# Patient Record
Sex: FEMALE
[De-identification: names, ages, dates, MRNs, and addresses within clinical notes are randomized per-mention and may not be internally consistent; named-entity substitution may affect disease eponyms.]

---

## 2018-11-18 ENCOUNTER — HOSPITAL ENCOUNTER (OUTPATIENT)
Dept: HOSPITAL 42 - ED | Age: 68
Setting detail: OBSERVATION
LOS: 2 days | Discharge: HOME | End: 2018-11-20
Attending: INTERNAL MEDICINE | Admitting: INTERNAL MEDICINE
Payer: MEDICARE

## 2018-11-18 VITALS — BODY MASS INDEX: 31.2 KG/M2

## 2018-11-18 DIAGNOSIS — M81.0: ICD-10-CM

## 2018-11-18 DIAGNOSIS — K57.32: Primary | ICD-10-CM

## 2018-11-18 DIAGNOSIS — I10: ICD-10-CM

## 2018-11-18 DIAGNOSIS — E10.9: ICD-10-CM

## 2018-11-18 PROCEDURE — 87040 BLOOD CULTURE FOR BACTERIA: CPT

## 2018-11-18 PROCEDURE — 83615 LACTATE (LD) (LDH) ENZYME: CPT

## 2018-11-18 PROCEDURE — 80053 COMPREHEN METABOLIC PANEL: CPT

## 2018-11-18 PROCEDURE — 74177 CT ABD & PELVIS W/CONTRAST: CPT

## 2018-11-18 PROCEDURE — 87177 OVA AND PARASITES SMEARS: CPT

## 2018-11-18 PROCEDURE — 87209 SMEAR COMPLEX STAIN: CPT

## 2018-11-18 PROCEDURE — 82550 ASSAY OF CK (CPK): CPT

## 2018-11-18 PROCEDURE — 82948 REAGENT STRIP/BLOOD GLUCOSE: CPT

## 2018-11-18 PROCEDURE — 99285 EMERGENCY DEPT VISIT HI MDM: CPT

## 2018-11-18 PROCEDURE — 87324 CLOSTRIDIUM AG IA: CPT

## 2018-11-18 PROCEDURE — 85730 THROMBOPLASTIN TIME PARTIAL: CPT

## 2018-11-18 PROCEDURE — 93005 ELECTROCARDIOGRAM TRACING: CPT

## 2018-11-18 PROCEDURE — 96367 TX/PROPH/DG ADDL SEQ IV INF: CPT

## 2018-11-18 PROCEDURE — 85027 COMPLETE CBC AUTOMATED: CPT

## 2018-11-18 PROCEDURE — 96365 THER/PROPH/DIAG IV INF INIT: CPT

## 2018-11-18 PROCEDURE — 85610 PROTHROMBIN TIME: CPT

## 2018-11-18 PROCEDURE — 36415 COLL VENOUS BLD VENIPUNCTURE: CPT

## 2018-11-18 PROCEDURE — 87045 FECES CULTURE AEROBIC BACT: CPT

## 2018-11-18 PROCEDURE — 71045 X-RAY EXAM CHEST 1 VIEW: CPT

## 2018-11-18 PROCEDURE — 83690 ASSAY OF LIPASE: CPT

## 2018-11-18 PROCEDURE — 96366 THER/PROPH/DIAG IV INF ADDON: CPT

## 2018-11-18 PROCEDURE — 85025 COMPLETE CBC W/AUTO DIFF WBC: CPT

## 2018-11-18 PROCEDURE — 96375 TX/PRO/DX INJ NEW DRUG ADDON: CPT

## 2018-11-18 PROCEDURE — 84484 ASSAY OF TROPONIN QUANT: CPT

## 2018-11-19 LAB
ALBUMIN SERPL-MCNC: 4.3 G/DL (ref 3–4.8)
ALBUMIN/GLOB SERPL: 1.3 {RATIO} (ref 1.1–1.8)
ALT SERPL-CCNC: 32 U/L (ref 7–56)
APTT BLD: 30.3 SECONDS (ref 25.1–36.5)
AST SERPL-CCNC: 25 U/L (ref 14–36)
BUN SERPL-MCNC: 23 MG/DL (ref 7–21)
CALCIUM SERPL-MCNC: 9.5 MG/DL (ref 8.4–10.5)
ERYTHROCYTE [DISTWIDTH] IN BLOOD BY AUTOMATED COUNT: 13.7 % (ref 11.5–14.5)
GFR NON-AFRICAN AMERICAN: > 60
HGB BLD-MCNC: 13.1 G/DL (ref 12–16)
INR PPP: 1.06
LIPASE SERPL-CCNC: 101 U/L (ref 23–300)
MCH RBC QN AUTO: 29.8 PG (ref 25–35)
MCHC RBC AUTO-ENTMCNC: 33.4 G/DL (ref 31–37)
MCV RBC AUTO: 89.1 FL (ref 80–105)
PLATELET # BLD: 174 10^3/UL (ref 120–450)
PMV BLD AUTO: 10.8 FL (ref 7–11)
PROTHROMBIN TIME: 12.1 SECONDS (ref 9.4–12.5)
RBC # BLD AUTO: 4.4 10^6/UL (ref 3.5–6.1)
TROPONIN I SERPL-MCNC: < 0.01 NG/ML
WBC # BLD AUTO: 11.5 10^3/UL (ref 4.5–11)

## 2018-11-19 RX ADMIN — PROMETHAZINE HYDROCHLORIDE SCH MG: 6.25 SYRUP ORAL at 21:40

## 2018-11-19 RX ADMIN — METRONIDAZOLE SCH MLS/HR: 500 INJECTION, SOLUTION INTRAVENOUS at 13:56

## 2018-11-19 RX ADMIN — METRONIDAZOLE SCH MLS/HR: 500 INJECTION, SOLUTION INTRAVENOUS at 21:40

## 2018-11-19 RX ADMIN — PROMETHAZINE HYDROCHLORIDE SCH MG: 6.25 SYRUP ORAL at 13:58

## 2018-11-19 NOTE — CP.PCM.CON
History of Present Illness





- History of Present Illness


History of Present Illness: 


Gastroenterology Fellow/PGY6 Consult Note





68 year old female with PMH of Diabetes, HTN, and osteoporosis presenting with 

abdominal pain. Patient notes onset of progressive lower abdominal pain leading 

to ER presentation. Also admits to increased soft stool frequency. Associated 

subjective fever. Denies nausea, vomiting, hematemesis, constipation, melena, 

hematochezia, unintentional weight loss, sick contacts, or recent travel/antib

iotics. Endorses prior EGD and colonoscopy in last five months to be normal.





Family History- denies stomach cancer, colon cancer


Social History- denies tobacco, alcohol, illicit drug use


Surgical History- none








Review of Systems





- Review of Systems


Review of Systems: 


12-point review of systems negative except for as above








Past Patient History





- Infectious Disease


Hx of Infectious Diseases: None





- Past Social History


Smoking Status: Never Smoked





- CARDIAC


Hx Cardiac Disorders: Yes


Hx Hypertension: Yes





- PULMONARY


Hx Respiratory Disorders: No





- NEUROLOGICAL


Hx Neurological Disorder: No





- HEENT


Hx HEENT Problems: No





- RENAL


Hx Chronic Kidney Disease: No





- ENDOCRINE/METABOLIC


Hx Diabetes Mellitus Type 2: Yes





- HEMATOLOGICAL/ONCOLOGICAL


Hx Blood Disorders: No





- INTEGUMENTARY


Hx Dermatological Problems: No





- MUSCULOSKELETAL/RHEUMATOLOGICAL


Hx Musculoskeletal Disorders: Yes


Hx Arthritis: Yes


Hx Osteoarthritis: Yes





- GASTROINTESTINAL


Hx Gastrointestinal Disorders: No





- GENITOURINARY/GYNECOLOGICAL


Hx Genitourinary Disorders: No





- PSYCHIATRIC


Hx Psychophysiologic Disorder: No





- SURGICAL HISTORY


Hx Surgeries: No





Meds


Allergies/Adverse Reactions: 


                                    Allergies











Allergy/AdvReac Type Severity Reaction Status Date / Time


 


No Known Allergies Allergy   Verified 11/19/18 00:38














- Medications


Medications: 


                               Current Medications





Sodium Chloride (Sodium Chloride 0.9%)  1,000 mls @ 100 mls/hr IV .Q10H [facility]


   Last Admin: 11/19/18 05:10 Dose:  100 mls/hr


Metronidazole (Flagyl)  500 mg in 100 mls @ 100 mls/hr IVPB Q8 [facility]; Protocol


   Last Admin: 11/19/18 13:56 Dose:  100 mls/hr


Ceftriaxone Sodium (Rocephin 1 Gram Ivpb)  1 gm in 100 mls @ 100 mls/hr IVPB 

DAILY [facility]; Protocol


Promethazine HCl (Phenergan Syrup)  6.25 mg PO BID [facility]


   Last Admin: 11/19/18 13:58 Dose:  6.25 mg











Physical Exam





- Constitutional


Appears: Non-toxic, No Acute Distress





- Head Exam


Head Exam: ATRAUMATIC, NORMOCEPHALIC





- Eye Exam


Eye Exam: EOMI, PERRL.  absent: Scleral icterus


Pupil Exam: PERRL.  absent: Miosis, Mydriatic





- ENT Exam


ENT Exam: Mucous Membranes Moist, Normal Oropharynx





- Neck Exam


Neck exam: Positive for: Full Rom, Normal Inspection





- Respiratory Exam


Respiratory Exam: Clear to Auscultation Bilateral.  absent: Rales, Rhonchi, 

Wheezes





- Cardiovascular Exam


Cardiovascular Exam: RRR, +S1, +S2.  absent: Gallop, Rubs





- GI/Abdominal Exam


GI & Abdominal Exam: Normal Bowel Sounds, Soft, Tenderness.  absent: Distended, 

Firm, Guarding, Organomegaly, Rebound, Rigid


Additional comments: 


B/L LQ tenderness to palpation








- Extremities Exam


Extremities exam: Positive for: normal inspection.  Negative for: pedal edema





- Neurological Exam


Neurological exam: Alert





- Psychiatric Exam


Psychiatric exam: Normal Affect, Normal Mood





- Skin


Skin Exam: Dry, Intact, Normal Color, Warm





Results





- Vital Signs


Recent Vital Signs: 


                                Last Vital Signs











Temp  98.3 F   11/19/18 08:37


 


Pulse  74   11/19/18 08:37


 


Resp  20   11/19/18 08:37


 


BP  121/71   11/19/18 08:37


 


Pulse Ox  94 L  11/19/18 08:37














- Labs


Result Diagrams: 


                                 11/19/18 01:22





                                 11/19/18 01:22


Labs: 


                         Laboratory Results - last 24 hr











  11/19/18 11/19/18 11/19/18





  01:22 01:22 01:22


 


WBC    11.5 H


 


RBC    4.40


 


Hgb    13.1


 


Hct    39.2


 


MCV    89.1


 


MCH    29.8


 


MCHC    33.4


 


RDW    13.7


 


Plt Count    174


 


MPV    10.8


 


PT  12.1  


 


INR  1.06  


 


APTT  30.3  


 


Sodium   138 


 


Potassium   4.2 


 


Chloride   102 


 


Carbon Dioxide   26 


 


Anion Gap   14 


 


BUN   23 H 


 


Creatinine   0.6 L 


 


Est GFR ( Amer)   > 60 


 


Est GFR (Non-Af Amer)   > 60 


 


POC Glucose (mg/dL)   


 


Random Glucose   219 H 


 


Calcium   9.5 


 


Total Bilirubin   0.9 


 


AST   25 


 


ALT   32 


 


Alkaline Phosphatase   106 


 


Lactate Dehydrogenase   450 


 


Total Creatine Kinase   91 


 


Troponin I   < 0.01 


 


Total Protein   7.6 


 


Albumin   4.3 


 


Globulin   3.3 


 


Albumin/Globulin Ratio   1.3 


 


Lipase   101 














  11/19/18





  05:47


 


WBC 


 


RBC 


 


Hgb 


 


Hct 


 


MCV 


 


MCH 


 


MCHC 


 


RDW 


 


Plt Count 


 


MPV 


 


PT 


 


INR 


 


APTT 


 


Sodium 


 


Potassium 


 


Chloride 


 


Carbon Dioxide 


 


Anion Gap 


 


BUN 


 


Creatinine 


 


Est GFR ( Amer) 


 


Est GFR (Non-Af Amer) 


 


POC Glucose (mg/dL)  120 H


 


Random Glucose 


 


Calcium 


 


Total Bilirubin 


 


AST 


 


ALT 


 


Alkaline Phosphatase 


 


Lactate Dehydrogenase 


 


Total Creatine Kinase 


 


Troponin I 


 


Total Protein 


 


Albumin 


 


Globulin 


 


Albumin/Globulin Ratio 


 


Lipase 














Assessment & Plan





- Assessment and Plan (Free Text)


Assessment: 


68 year old female with PMH of Diabetes, HTN, and osteoporosis presenting with 

abdominal pain. Active treatment of uncomoplicated sigmoid diverticulitis noted 

on CT A/P IV contrast. Endorses prior EGD and colonoscopy in last five months to

be normal.


Plan: 





-ordered Cdiff, stool culture, O&P


-on ceftriaxone and flagyl


-liquid diet, advance as tolerated


-will require 10-14 day antibiotic course


-endorses recent colonoscopy in last 6 months with poor prep


-will benefit from outpatient follow up with gastroenterologist for re-

evaluation and repeat colonoscopy in 6-8 weeks


-will follow clinical course

## 2018-11-19 NOTE — CT
Date of service: 



11/19/2018



PROCEDURE:  CT Abdomen and Pelvis with contrast



HISTORY:

abdominal pain



COMPARISON:

None.



TECHNIQUE:

Contrast dose: 100 cc of Omni 350



Radiation dose:



Total exam DLP = 878.29 mGy-cm.



This CT exam was performed using one or more of the following dose 

reduction techniques: Automated exposure control, adjustment of the 

mA and/or kV according to patient size, and/or use of iterative 

reconstruction technique.



FINDINGS:



LOWER THORAX:

Unremarkable. 



LIVER:

Unremarkable. No gross lesion or ductal dilatation. 



GALLBLADDER AND BILE DUCTS:

Unremarkable. 



PANCREAS:

Unremarkable. No gross lesion or ductal dilatation.



SPLEEN:

Unremarkable. 



ADRENALS:

Unremarkable. No mass. 



KIDNEYS AND URETERS:

Unremarkable. No hydronephrosis. No solid mass. 



VASCULATURE:

Unremarkable. No aortic aneurysm. No aortic atherosclerotic 

calcification or mural plaque present.



BOWEL:

There is mural thickening and mesenteric inflammation in the sigmoid 

colon consistent with acute diverticulitis.  There is no evidence of 

abscess or free air.  



APPENDIX:

Normal appendix. 



PERITONEUM:

Unremarkable. No free fluid. No free air. 



LYMPH NODES:

Unremarkable. No enlarged lymph nodes. 



BLADDER:

Unremarkable. 



REPRODUCTIVE:

Unremarkable. 



BONES:

No acute fracture. 



OTHER FINDINGS:

The report concurs with the preliminary [facility] report



IMPRESSION:

There is mural thickening and mesenteric inflammation in the sigmoid 

colon consistent with acute diverticulitis.  There is no evidence of 

abscess or free air.

## 2018-11-19 NOTE — ED PDOC
Arrival/HPI





- General


Chief Complaint: Headache


Time Seen by Provider: 11/19/18 00:16


Historian: Patient





- History of Present Illness


Narrative History of Present Illness (Text): 





11/19/18 01:07


A 68 year old female, whose past medical history includes diabetes, hypertension

and osteoporosis, presents to the emergency department complaining of 

generalized malaise,lower abdominal discomfort.Slight headache.No known fever. 

Patient states she is also having trouble sleeping and states her  is 

currently in the hospital. Patient denies any visual disturbances, chills,  

shortness of breath, chest pain, diarrhea, nausea, vomiting, urinary symptoms, 

back pain, neck pain, headache, dizziness, or any other complaints. 


PMD: Dr. English





Time/Duration: Other (earlier today)


Symptom Onset: Gradual


Activities at Onset: Light


Context: Home





Past Medical History





- Provider Review


Nursing Documentation Reviewed: Yes





- Infectious Disease


Hx of Infectious Diseases: None





- Cardiac


Hx Cardiac Disorders: No





- Pulmonary


Hx Respiratory Disorders: No





- Neurological


Hx Neurological Disorder: No





- HEENT


Hx HEENT Disorder: No





- Renal


Hx Renal Disorder: No





- Endocrine/Metabolic


Hx Endocrine Disorders: Yes


Hx Diabetes Mellitus Type 1: Yes





- Hematological/Oncological


Hx Blood Disorders: No





- Integumentary


Hx Dermatological Disorder: No





- Musculoskeletal/Rheumatological


Hx Musculoskeletal Disorders: Yes


Hx Arthritis: Yes


Hx Osteomyelitis: Yes





- Gastrointestinal


Hx Gastrointestinal Disorders: No





- Genitourinary/Gynecological


Hx Genitourinary Disorders: No





- Psychiatric


Hx Psychophysiologic Disorder: No


Hx Substance Use: No





Family/Social History





- Physician Review


Nursing Documentation Reviewed: Yes


Family/Social History: No Known Family HX


Smoking Status: Never Smoked


Hx Alcohol Use: No


Hx Substance Use: No





Allergies/Home Meds


Allergies/Adverse Reactions: 


Allergies





No Known Allergies Allergy (Verified 11/19/18 00:38)


   








Home Medications: 


                                    Home Meds











 Medication  Instructions  Recorded  Confirmed


 


No Known Home Med  11/19/18 11/19/18














Review of Systems





- Physician Review


All systems were reviewed & negative as marked: Yes





- Review of Systems


Constitutional: Other (trouble sleeping).  absent: Fevers, Night Sweats


Eyes: absent: Vision Changes (no visual disturbances)


Respiratory: absent: SOB


Cardiovascular: absent: Chest Pain


Gastrointestinal: Abdominal Pain (abdominal discomfort).  absent: Diarrhea, 

Nausea, Vomiting


Musculoskeletal: absent: Back Pain, Neck Pain


Neurological: Headache.  absent: Dizziness





Physical Exam


Vital Signs Reviewed: Yes





Vital Signs











  Temp Pulse Resp BP Pulse Ox


 


 11/19/18 00:28  98.1 F  76  18  144/86  98











Temperature: Afebrile


Blood Pressure: Normal


Pulse: Regular


Respiratory Rate: Normal


Appearance: Positive for: Well-Appearing, Non-Toxic, Comfortable


Pain Distress: None


Mental Status: Positive for: Alert and Oriented X 3





- Systems Exam


Head: Present: Atraumatic, Normocephalic


Pupils: Present: PERRL


Extroacular Muscles: Present: EOMI


Conjunctiva: Present: Normal


Mouth: Present: Moist Mucous Membranes


Neck: Present: Normal Range of Motion


Respiratory/Chest: Present: Clear to Auscultation, Good Air Exchange.  No: 

Respiratory Distress, Accessory Muscle Use


Cardiovascular: Present: Regular Rate and Rhythm, Normal S1, S2.  No: Murmurs


Abdomen: Present: Tenderness (left lower abdomen), Normal Bowel Sounds.  No: 

Distention, Peritoneal Signs, McBurney's Point Tender, Hernias


Back: Present: Normal Inspection


Upper Extremity: Present: Normal Inspection.  No: Cyanosis, Edema


Lower Extremity: Present: Normal Inspection.  No: Edema


Neurological: Present: GCS=15, CN II-XII Intact, Speech Normal, Motor Func 

Grossly Intact, Normal Sensory Function


Skin: Present: Warm, Dry, Normal Color.  No: Rashes


Psychiatric: Present: Alert, Oriented x 3, Normal Insight, Normal Concentration





Medical Decision Making


ED Course and Treatment: 





11/19/18 01:10


Impression: 68 year old female presents to the emergency room for generalized 

malaise,abdominal discomfort. 





Plan:


-- EKG


-- Cardiac ISO


-- CMP


-- Lipase


-- CBC


-- COAGs


-- Chest X-ray 


-- Reassess and disposition





Prior Visits:


Notes and results from previous visits were reviewed.  





Progress Notes:





11/19/18 01:29


EKG: Ordered, reviewed, and independently interpreted the EKG.


Rate : 72 BPM


Rhythm : NSR


Interpretation : Non specific T-wave changes.





11/19/18 03:28


Procedure: Chest X-ray 


Impression: No acute process.


Reviewed by me.





11/19/18 03:55


Procedure: CT abdomen and pelvis with contrast


Impression: Acute proximal sigmoid diverticulitis without perforation or abscess

formation.


Dictator: Dr. Ayde Dailey M.D.





11/19/18 05:24


Case discussed with Dr. Rivera who accepts the patient into his service for 

further observation and requests Dr. Núñez on consult.





- RAD Interpretation


Radiology Orders: 











11/19/18 00:51


CHEST PORTABLE [RAD] Stat 














- Scribe Statement


The provider has reviewed the documentation as recorded by the Scribe





Elizabet Quinn





All medical record entries made by the Scribe were at my direction and 

personally dictated by me. I have reviewed the chart and agree that the record 

accurately reflects my personal performance of the history, physical exam, 

medical decision making, and the department course for this patient. I have also

 personally directed, reviewed, and agree with the discharge instructions and 

disposition.








Disposition/Present on Arrival





- Present on Arrival


Any Indicators Present on Arrival: No


History of DVT/PE: No


History of Uncontrolled Diabetes: No


Urinary Catheter: No


History of Decub. Ulcer: No


History Surgical Site Infection Following: None





- Disposition


Have Diagnosis and Disposition been Completed?: Yes


Diagnosis: 


 Diverticulitis





Disposition: HOSPITALIZED


Disposition Time: 05:01


Patient Plan: Admission


Patient Problems: 


                             Current Active Problems











Problem Status Onset


 


Diverticulitis Acute 











Condition: STABLE

## 2018-11-19 NOTE — CARD
--------------- APPROVED REPORT --------------





Date of service: 11/19/2018



EKG Measurement

Heart [id number]

[id number]

[id number]

[id number]

[id number]



<Conclusion>

Normal sinus rhythm

Nonspecific T wave abnormality

LAD

## 2018-11-19 NOTE — CP.PCM.HP
<Jacob Mota - Last Filed: 11/19/18 15:13>





History of Present Illness





- History of Present Illness


History of Present Illness: 





H&P for Dr. Rivera Service





CC: Lower abdominal pain





This is a 67 yo  F with PMH of DM-1, HTN, osteoporosis, and arthritis 

who presented with complaint of worsening malaise and lower abdominal pain x2-3 

days.  As per patient, patient worsening without acute exacerbating factor over 

the last 2-3 days, and she has noticed increased frequency and softer stools 

which she likens in consistency to phlegm.  Denies melena, bright red blood per 

rectum, emesis, fevers, chills, or shortness of breath.  Does admit to nausea 

and decreased appetite, but has been able to tolerate PO intake.  Additionally 

complains of headache, and admits to increased stress, as her  is 

currently hospitalized (here at [facility] [place]  At time of exam, patient is resting 

comfortably in bed, talking on a cell phone, not in any acute distress.





Of note, in the ED, CT abd/pelvis was obtained that was notable for mural 

thickening and mesenteric inflammation suggestive of acute sigmoid 

diverticulitis, without evidence of abscess or perforation.





12 system ROS reviewed and negative except as above.





PMH: as above


PSH: denies


Fam Hx: HTN


Soc Hx: Denies tobacco, alcohol, illicits, IVDA





PMD: Dr. English








Present on Admission





- Present on Admission


Any Indicators Present on Admission: No


History of DVT/PE: No


History of Uncontrolled Diabetes: No


Urinary Catheter: No





Review of Systems





- Review of Systems


All systems: reviewed and no additional remarkable complaints except (as per 

HPI)





Past Patient History





- Infectious Disease


Hx of Infectious Diseases: None





- Past Social History


Smoking Status: Never Smoked





- CARDIAC


Hx Cardiac Disorders: Yes


Hx Hypertension: Yes





- PULMONARY


Hx Respiratory Disorders: No





- NEUROLOGICAL


Hx Neurological Disorder: No





- HEENT


Hx HEENT Problems: No





- RENAL


Hx Chronic Kidney Disease: No





- ENDOCRINE/METABOLIC


Hx Diabetes Mellitus Type 2: Yes





- HEMATOLOGICAL/ONCOLOGICAL


Hx Blood Disorders: No





- INTEGUMENTARY


Hx Dermatological Problems: No





- MUSCULOSKELETAL/RHEUMATOLOGICAL


Hx Musculoskeletal Disorders: Yes


Hx Arthritis: Yes


Hx Osteoarthritis: Yes





- GASTROINTESTINAL


Hx Gastrointestinal Disorders: No





- GENITOURINARY/GYNECOLOGICAL


Hx Genitourinary Disorders: No





- PSYCHIATRIC


Hx Psychophysiologic Disorder: No





- SURGICAL HISTORY


Hx Surgeries: No





Meds


Allergies/Adverse Reactions: 


                                    Allergies











Allergy/AdvReac Type Severity Reaction Status Date / Time


 


No Known Allergies Allergy   Verified 11/19/18 00:38














Physical Exam





- Constitutional


Appears: Non-toxic, No Acute Distress





- Head Exam


Head Exam: ATRAUMATIC, NORMAL INSPECTION, NORMOCEPHALIC





- Eye Exam


Eye Exam: EOMI, Normal appearance.  absent: Conjunctival injection, Scleral 

icterus


Pupil Exam: absent: Irregular, Unequal





- ENT Exam


ENT Exam: Mucous Membranes Dry





- Neck Exam


Neck exam: Positive for: Full Rom, Normal Inspection.  Negative for: 

Lymphadenopathy





- Respiratory Exam


Respiratory Exam: Clear to Auscultation Bilateral, NORMAL BREATHING PATTERN.  

absent: Accessory Muscle Use, Chest Wall Tenderness, Decreased Breath Sounds, 

Rales, Rhonchi, Wheezes





- Cardiovascular Exam


Cardiovascular Exam: REGULAR RHYTHM, RRR, +S1, +S2.  absent: Bradycardia, 

Tachycardia, Irregular Rhythm, JVD, +S4





- GI/Abdominal Exam


GI & Abdominal Exam: Normal Bowel Sounds, Soft, Tenderness (diffusely tender to 

palpation in all quadrants, tenderness in lower quadrants > upper quadrants, no 

CVA tenderness).  absent: Diminished Bowel Sounds, Distended, Firm, Hyperactive 

Bowel Sounds, Hypoactive Bowel Sounds, Rigid


Additional comments: 





Reports improvement in her pain by laying in left lateral recumbent position








- Extremities Exam


Extremities exam: Positive for: normal inspection, pedal pulses present.  

Negative for: calf tenderness, pedal edema





- Back Exam


Back exam: absent: CVA tenderness (L), CVA tenderness (R)





- Neurological Exam


Additional comments: 





awake and alert, moving all extremities spontaneously, following all commands 

appropriately








- Psychiatric Exam


Psychiatric exam: Normal Affect, Normal Mood





- Skin


Skin Exam: Dry, Intact, Normal Color, Warm





Results





- Vital Signs


Recent Vital Signs: 





                                Last Vital Signs











Temp  98.3 F   11/19/18 08:37


 


Pulse  74   11/19/18 08:37


 


Resp  20   11/19/18 08:37


 


BP  121/71   11/19/18 08:37


 


Pulse Ox  94 L  11/19/18 08:37














- Labs


Result Diagrams: 


                                 11/19/18 01:22





                                 11/19/18 01:22


Labs: 





                         Laboratory Results - last 24 hr











  11/19/18 11/19/18 11/19/18





  01:22 01:22 01:22


 


WBC    11.5 H


 


RBC    4.40


 


Hgb    13.1


 


Hct    39.2


 


MCV    89.1


 


MCH    29.8


 


MCHC    33.4


 


RDW    13.7


 


Plt Count    174


 


MPV    10.8


 


PT  12.1  


 


INR  1.06  


 


APTT  30.3  


 


Sodium   138 


 


Potassium   4.2 


 


Chloride   102 


 


Carbon Dioxide   26 


 


Anion Gap   14 


 


BUN   23 H 


 


Creatinine   0.6 L 


 


Est GFR ( Amer)   > 60 


 


Est GFR (Non-Af Amer)   > 60 


 


POC Glucose (mg/dL)   


 


Random Glucose   219 H 


 


Calcium   9.5 


 


Total Bilirubin   0.9 


 


AST   25 


 


ALT   32 


 


Alkaline Phosphatase   106 


 


Lactate Dehydrogenase   450 


 


Total Creatine Kinase   91 


 


Troponin I   < 0.01 


 


Total Protein   7.6 


 


Albumin   4.3 


 


Globulin   3.3 


 


Albumin/Globulin Ratio   1.3 


 


Lipase   101 














  11/19/18





  05:47


 


WBC 


 


RBC 


 


Hgb 


 


Hct 


 


MCV 


 


MCH 


 


MCHC 


 


RDW 


 


Plt Count 


 


MPV 


 


PT 


 


INR 


 


APTT 


 


Sodium 


 


Potassium 


 


Chloride 


 


Carbon Dioxide 


 


Anion Gap 


 


BUN 


 


Creatinine 


 


Est GFR ( Amer) 


 


Est GFR (Non-Af Amer) 


 


POC Glucose (mg/dL)  120 H


 


Random Glucose 


 


Calcium 


 


Total Bilirubin 


 


AST 


 


ALT 


 


Alkaline Phosphatase 


 


Lactate Dehydrogenase 


 


Total Creatine Kinase 


 


Troponin I 


 


Total Protein 


 


Albumin 


 


Globulin 


 


Albumin/Globulin Ratio 


 


Lipase 














Assessment & Plan





- Assessment and Plan (Free Text)


Assessment: 





This is a 67 yo  F with PMH of DM-1, HTN, osteoporosis, and arthritis 

who presented with complaint of worsening malaise and lower abdominal pain x2-3 

days.  Admitted with concern for acute sigmoid diverticulitis.


Plan: 





1) Generalized abd pain


-collitis vs enteritis vs diverticulitis


   possibly an anxiety component given stress with hospitalized 


-CT Abd/pelvis notable for acute sigmoid diverticulitis without signs of abscess

or perforation


-Started on IV Flagyl and Rocephin by ED, will continue IV on floors and convert

to PO flagyl and cipro prior to discharge


-GI consulted, appreciate their recs


-mild leukocytosis without febrile temps; blood cultures obtained, pending 

results, f/u


-Tylenol/Percocet PRN for mild/moderate pain, Promethazine for N/V ppx


-Will trial on clear liquid diet, if tolerates advance as tolerated


-Hold home crestor for now


-NS 100cc/hr to make up for presumed fluid deficit given poor PO intake and dry 

mucous membranes on exam





2) HTN


-currently normotensive, will hold home antiHTN meds at this time





3) Reported hx DM-1


-no home insulins listed in home medications


-will start low-dose sliding scale for coverage and fingersticks ACHS


   will f/u with patient's outpatient pharmacy to confirm home meds





Dispo: Med-surg, pending GI recs, pending advancement of diet as tolerated, 

possible d/c tomorrow


FEN: Liquid diet


Access: Peripheral IV


Consults: GI


Ppx: Protonix for GI, SCDs for DVT





Patient reviewed and discussed with attending, Dr. Rivera





<Mikey Rivera S - Last Filed: 11/19/18 21:23>





Results





- Vital Signs


Recent Vital Signs: 





                                Last Vital Signs











Temp  98.3 F   11/19/18 16:39


 


Pulse  65   11/19/18 16:39


 


Resp  20   11/19/18 16:39


 


BP  118/62   11/19/18 16:39


 


Pulse Ox  96   11/19/18 16:39














- Labs


Result Diagrams: 


                                 11/19/18 01:22





                                 11/19/18 01:22


Labs: 





                         Laboratory Results - last 24 hr











  11/19/18 11/19/18 11/19/18





  01:22 01:22 01:22


 


WBC    11.5 H


 


RBC    4.40


 


Hgb    13.1


 


Hct    39.2


 


MCV    89.1


 


MCH    29.8


 


MCHC    33.4


 


RDW    13.7


 


Plt Count    174


 


MPV    10.8


 


PT  12.1  


 


INR  1.06  


 


APTT  30.3  


 


Sodium   138 


 


Potassium   4.2 


 


Chloride   102 


 


Carbon Dioxide   26 


 


Anion Gap   14 


 


BUN   23 H 


 


Creatinine   0.6 L 


 


Est GFR ( Amer)   > 60 


 


Est GFR (Non-Af Amer)   > 60 


 


POC Glucose (mg/dL)   


 


Random Glucose   219 H 


 


Calcium   9.5 


 


Total Bilirubin   0.9 


 


AST   25 


 


ALT   32 


 


Alkaline Phosphatase   106 


 


Lactate Dehydrogenase   450 


 


Total Creatine Kinase   91 


 


Troponin I   < 0.01 


 


Total Protein   7.6 


 


Albumin   4.3 


 


Globulin   3.3 


 


Albumin/Globulin Ratio   1.3 


 


Lipase   101 














  11/19/18





  05:47


 


WBC 


 


RBC 


 


Hgb 


 


Hct 


 


MCV 


 


MCH 


 


MCHC 


 


RDW 


 


Plt Count 


 


MPV 


 


PT 


 


INR 


 


APTT 


 


Sodium 


 


Potassium 


 


Chloride 


 


Carbon Dioxide 


 


Anion Gap 


 


BUN 


 


Creatinine 


 


Est GFR ( Amer) 


 


Est GFR (Non-Af Amer) 


 


POC Glucose (mg/dL)  120 H


 


Random Glucose 


 


Calcium 


 


Total Bilirubin 


 


AST 


 


ALT 


 


Alkaline Phosphatase 


 


Lactate Dehydrogenase 


 


Total Creatine Kinase 


 


Troponin I 


 


Total Protein 


 


Albumin 


 


Globulin 


 


Albumin/Globulin Ratio 


 


Lipase 














Assessment & Plan





- Assessment and Plan (Free Text)


Plan: 





Pt seen and examined. I have reviewed the note of the medical resident and agree

with it. I have discussed the assessment and plan with the resident.  I have 

reviewed the patient's labs and medications. She has acute diverticulits. She is

on Flagyl and Rocephin. GI will evaluate. Pain controlled on Tylenol and 

Percocet. Continue with IVF.

## 2018-11-19 NOTE — RAD
Date of service: 



11/19/2018



PROCEDURE:  CHEST RADIOGRAPH, 1 VIEW



HISTORY:

abdominal pain



COMPARISON:

None available.



FINDINGS:



LUNGS:

The lungs are well inflated and clear.  There is subsegmental 

atelectasis in the left lower lobe. 



PLEURA:

No pneumothorax or pleural effusion.



CARDIOVASCULAR:

The heart is normal in size.  No aortic atherosclerotic 

calcifications present. 



OSSEOUS STRUCTURES:

Within normal limits for the patient's age.



VISUALIZED UPPER ABDOMEN:

Normal.



OTHER FINDINGS:

None. 



IMPRESSION:

No active pulmonary disease.

## 2018-11-20 VITALS
RESPIRATION RATE: 19 BRPM | HEART RATE: 60 BPM | DIASTOLIC BLOOD PRESSURE: 75 MMHG | OXYGEN SATURATION: 95 % | SYSTOLIC BLOOD PRESSURE: 121 MMHG | TEMPERATURE: 98.1 F

## 2018-11-20 LAB
ALBUMIN SERPL-MCNC: 3.2 G/DL (ref 3–4.8)
ALBUMIN/GLOB SERPL: 1.1 {RATIO} (ref 1.1–1.8)
ALT SERPL-CCNC: 28 U/L (ref 7–56)
AST SERPL-CCNC: 17 U/L (ref 14–36)
BASOPHILS # BLD AUTO: 0.01 K/MM3 (ref 0–2)
BASOPHILS NFR BLD: 0.1 % (ref 0–3)
BUN SERPL-MCNC: 11 MG/DL (ref 7–21)
CALCIUM SERPL-MCNC: 8.3 MG/DL (ref 8.4–10.5)
EOSINOPHIL # BLD: 0.1 10*3/UL (ref 0–0.7)
EOSINOPHIL NFR BLD: 0.7 % (ref 1.5–5)
ERYTHROCYTE [DISTWIDTH] IN BLOOD BY AUTOMATED COUNT: 14.1 % (ref 11.5–14.5)
GFR NON-AFRICAN AMERICAN: > 60
GRANULOCYTES # BLD: 6.9 10*3/UL (ref 1.4–6.5)
GRANULOCYTES NFR BLD: 65.7 % (ref 50–68)
HGB BLD-MCNC: 11.9 G/DL (ref 12–16)
LYMPHOCYTES # BLD: 2.9 10*3/UL (ref 1.2–3.4)
LYMPHOCYTES NFR BLD AUTO: 27.2 % (ref 22–35)
MCH RBC QN AUTO: 30.4 PG (ref 25–35)
MCHC RBC AUTO-ENTMCNC: 33.9 G/DL (ref 31–37)
MCV RBC AUTO: 89.8 FL (ref 80–105)
MONOCYTES # BLD AUTO: 0.7 10*3/UL (ref 0.1–0.6)
MONOCYTES NFR BLD: 6.3 % (ref 1–6)
PLATELET # BLD: 146 10^3/UL (ref 120–450)
PMV BLD AUTO: 10.6 FL (ref 7–11)
RBC # BLD AUTO: 3.91 10^6/UL (ref 3.5–6.1)
WBC # BLD AUTO: 10.5 10^3/UL (ref 4.5–11)

## 2018-11-20 RX ADMIN — PROMETHAZINE HYDROCHLORIDE SCH MG: 6.25 SYRUP ORAL at 11:13

## 2018-11-20 RX ADMIN — METRONIDAZOLE SCH MLS/HR: 500 INJECTION, SOLUTION INTRAVENOUS at 05:20

## 2018-11-20 NOTE — CP.PCM.DIS
<Jacob Mota - Last Filed: 11/20/18 14:54>





Provider





- Provider


Date of Admission: 


11/19/18 05:01





Attending physician: 


Mikey Rivera MD





Primary care physician: 


Kari English DO





Consults: 


GI: [place]


Time Spent in preparation of Discharge (in minutes): 35





Diagnosis





- Discharge Diagnosis


(1) Diverticulitis


Status: Acute   Priority: High   





(2) Diabetes type I


Status: Chronic   Priority: Medium   





(3) HTN (hypertension)


Status: Chronic   Priority: Medium   





(4) Osteoporosis


Status: Chronic   Priority: Medium   





Hospital Course





- Lab Results


Lab Results: 


                                  Micro Results





11/20/18 00:50   Stool   C. difficile Antigen & Toxins A,B - Final


11/19/18 05:20   Blood   Blood Culture - Preliminary


                            NO GROWTH AFTER 24 HOURS


11/19/18 04:53   Blood   Blood Culture - Preliminary


                            NO GROWTH AFTER 24 HOURS





                             Most Recent Lab Values











WBC  10.5 10^3/uL (4.5-11.0)   11/20/18  07:15    


 


RBC  3.91 10^6/uL (3.5-6.1)   11/20/18  07:15    


 


Hgb  11.9 g/dL (12.0-16.0)  L  11/20/18  07:15    


 


Hct  35.1 % (36.0-48.0)  L  11/20/18  07:15    


 


MCV  89.8 fl (80.0-105.0)   11/20/18  07:15    


 


MCH  30.4 pg (25.0-35.0)   11/20/18  07:15    


 


MCHC  33.9 g/dl (31.0-37.0)   11/20/18  07:15    


 


RDW  14.1 % (11.5-14.5)   11/20/18  07:15    


 


Plt Count  146 10^3/uL (120.0-450.0)   11/20/18  07:15    


 


MPV  10.6 fl (7.0-11.0)   11/20/18  07:15    


 


Gran %  65.7 % (50.0-68.0)   11/20/18  07:15    


 


Lymph % (Auto)  27.2 % (22.0-35.0)   11/20/18  07:15    


 


Mono % (Auto)  6.3 % (1.0-6.0)  H  11/20/18  07:15    


 


Eos % (Auto)  0.7 % (1.5-5.0)  L  11/20/18  07:15    


 


Baso % (Auto)  0.1 % (0.0-3.0)   11/20/18  07:15    


 


Gran #  6.90  (1.4-6.5)  H  11/20/18  07:15    


 


Lymph # (Auto)  2.9  (1.2-3.4)   11/20/18  07:15    


 


Mono # (Auto)  0.7  (0.1-0.6)  H  11/20/18  07:15    


 


Eos # (Auto)  0.1  (0.0-0.7)   11/20/18  07:15    


 


Baso # (Auto)  0.01 K/mm3 (0.0-2.0)   11/20/18  07:15    


 


PT  12.1 SECONDS (9.4-12.5)   11/19/18  01:22    


 


INR  1.06   11/19/18  01:22    


 


APTT  30.3 Seconds (25.1-36.5)   11/19/18  01:22    


 


Sodium  140 mmol/L (132-148)   11/20/18  07:15    


 


Potassium  3.9 mmol/L (3.6-5.0)   11/20/18  07:15    


 


Chloride  108 mmol/L ()  H  11/20/18  07:15    


 


Carbon Dioxide  27 mmol/L (21-33)   11/20/18  07:15    


 


Anion Gap  9  (10-20)  L  11/20/18  07:15    


 


BUN  11 mg/dL (7-21)   11/20/18  07:15    


 


Creatinine  0.7 mg/dl (0.7-1.2)   11/20/18  07:15    


 


Est GFR ( Amer)  > 60   11/20/18  07:15    


 


Est GFR (Non-Af Amer)  > 60   11/20/18  07:15    


 


POC Glucose (mg/dL)  120 mg/dL ()  H  11/19/18  05:47    


 


Random Glucose  143 mg/dL ()  H  11/20/18  07:15    


 


Calcium  8.3 mg/dL (8.4-10.5)  L  11/20/18  07:15    


 


Total Bilirubin  0.9 mg/dL (0.2-1.3)   11/20/18  07:15    


 


AST  17 U/L (14-36)   11/20/18  07:15    


 


ALT  28 U/L (7-56)   11/20/18  07:15    


 


Alkaline Phosphatase  74 U/L ()   11/20/18  07:15    


 


Lactate Dehydrogenase  450 U/L (333-699)   11/19/18  01:22    


 


Total Creatine Kinase  91 U/L ()   11/19/18  01:22    


 


Troponin I  < 0.01 ng/mL  11/19/18  01:22    


 


Total Protein  6.2 g/dL (5.8-8.3)   11/20/18  07:15    


 


Albumin  3.2 g/dL (3.0-4.8)   11/20/18  07:15    


 


Globulin  3.0 gm/dL  11/20/18  07:15    


 


Albumin/Globulin Ratio  1.1  (1.1-1.8)   11/20/18  07:15    


 


Lipase  101 U/L ()   11/19/18  01:22    














- Hospital Course


Hospital Course: 





Discharge Summary for Dr. Rivera Service





This is a 67 yo  F with PMH of DM-1, HTN, osteoporosis, and arthritis 

who presented with complaint of worsening malaise and lower abdominal pain x2-3 

days.  Admitted with concern for acute sigmoid diverticulitis.  While here, the 

patient was also seen by GI.  As per GI, f/u stool studies, C. diff, and Ova & 

Parasites testing; 10-14 days of antibiotics and follow up with GI as outpatient

in 6-8 weeks to obtain colonoscopy.





Today, patient is resting comfortably in bed.  Denies acute or diffuse abdominal

pain, but still has some tenderness to palpation lateral to the left quadrants. 

Denies nausea or vomiting, and reports last bowel movement was normal.  Denies 

dysuria or diarrhea, denies melena or bright red blood per rectum.





Patient was given a prescription for Flagyl and Augmentin for 8 days, to 

complete a total antibiotic course of 10 days.  She was also instructed to 

resume her home medications as prescribed, to follow up with Dr. Rivera within

1 week of discharge, and to follow up with GI as explained.  She expressed 

understanding and agreement.  Patient was then discharged to home.





Reviewed and discussed with attending, Dr. Rivera.





Discharge Exam





- Head Exam


Head Exam: ATRAUMATIC, NORMOCEPHALIC





- Additional Findings


Additional findings: 





- Constitutional


Appears: Non-toxic, No Acute Distress





- Head Exam


Head Exam: ATRAUMATIC, NORMAL INSPECTION, NORMOCEPHALIC





- Eye Exam


Eye Exam: EOMI, Normal appearance.  absent: Conjunctival injection, Scleral 

icterus


Pupil Exam: absent: Irregular, Unequal





- ENT Exam


ENT Exam: Mucous Membranes Moist





- Neck Exam


Neck exam: Positive for: Full Rom, Normal Inspection.  Negative for: 

Lymphadenopathy





- Respiratory Exam


Respiratory Exam: Clear to Auscultation Bilateral, NORMAL BREATHING PATTERN.  

absent: Accessory Muscle Use, Chest Wall Tenderness, Decreased Breath Sounds, 

Rales, Rhonchi, Wheezes





- Cardiovascular Exam


Cardiovascular Exam: REGULAR RHYTHM, RRR, +S1, +S2.  absent: Bradycardia, 

Tachycardia, Irregular Rhythm, JVD, +S4





- GI/Abdominal Exam


GI & Abdominal Exam: Normal Bowel Sounds, Soft, Tenderness (tenderness to left 

flank, improved compared to yesterday).  absent: Diminished Bowel Sounds, 

Distended, Firm, Hyperactive Bowel Sounds, Hypoactive Bowel Sounds, Rigid





- Extremities Exam


Extremities exam: Positive for: normal inspection, pedal pulses present.  

Negative for: calf tenderness, pedal edema





- Back Exam


Back exam: absent: CVA tenderness (L), CVA tenderness (R)





- Neurological Exam


awake and alert, moving all extremities spontaneously, following all commands 

appropriately





- Psychiatric Exam


Psychiatric exam: Normal Affect, Normal Mood





- Skin


Skin Exam: Dry, Intact, Normal Color, Warm





Discharge Plan





- Discharge Medications


Prescriptions: 


Amoxicillin/Clavulanate [Augmentin 875 MG-125 MG] 1 tab PO BID #16 tab


Metronidazole [Flagyl] 500 mg PO QID #32 tab





- Follow Up Plan


Condition: STABLE


Disposition: HOME/ ROUTINE


Instructions:  Diverticulitis (DC), Diverticulitis


Additional Instructions: 


PLEASE FOLLOW UP WITH DR RIVERA IN 1 WEEK.


TAKE ALL MEDICATIONS AS PRESCRIBED.


Referrals: 


Mikey Rivera MD [Staff Provider] - 


mK Núñez MD [Medical Doctor] - 





<Mikey Rivera - Last Filed: 11/20/18 15:56>





Provider





- Provider


Date of Admission: 


11/19/18 05:01





Attending physician: 


Mikey Rivera MD





Primary care physician: 


Kari KINCAID [facility] Course





- Lab Results


Lab Results: 


                                  Micro Results





11/20/18 00:50   Stool   C. difficile Antigen & Toxins A,B - Final


11/19/18 05:20   Blood   Blood Culture - Preliminary


                            NO GROWTH AFTER 24 HOURS


11/19/18 04:53   Blood   Blood Culture - Preliminary


                            NO GROWTH AFTER 24 HOURS





                             Most Recent Lab Values











WBC  10.5 10^3/uL (4.5-11.0)   11/20/18  07:15    


 


RBC  3.91 10^6/uL (3.5-6.1)   11/20/18  07:15    


 


Hgb  11.9 g/dL (12.0-16.0)  L  11/20/18  07:15    


 


Hct  35.1 % (36.0-48.0)  L  11/20/18  07:15    


 


MCV  89.8 fl (80.0-105.0)   11/20/18  07:15    


 


MCH  30.4 pg (25.0-35.0)   11/20/18  07:15    


 


MCHC  33.9 g/dl (31.0-37.0)   11/20/18  07:15    


 


RDW  14.1 % (11.5-14.5)   11/20/18  07:15    


 


Plt Count  146 10^3/uL (120.0-450.0)   11/20/18  07:15    


 


MPV  10.6 fl (7.0-11.0)   11/20/18  07:15    


 


Gran %  65.7 % (50.0-68.0)   11/20/18  07:15    


 


Lymph % (Auto)  27.2 % (22.0-35.0)   11/20/18  07:15    


 


Mono % (Auto)  6.3 % (1.0-6.0)  H  11/20/18  07:15    


 


Eos % (Auto)  0.7 % (1.5-5.0)  L  11/20/18  07:15    


 


Baso % (Auto)  0.1 % (0.0-3.0)   11/20/18  07:15    


 


Gran #  6.90  (1.4-6.5)  H  11/20/18  07:15    


 


Lymph # (Auto)  2.9  (1.2-3.4)   11/20/18  07:15    


 


Mono # (Auto)  0.7  (0.1-0.6)  H  11/20/18  07:15    


 


Eos # (Auto)  0.1  (0.0-0.7)   11/20/18  07:15    


 


Baso # (Auto)  0.01 K/mm3 (0.0-2.0)   11/20/18  07:15    


 


PT  12.1 SECONDS (9.4-12.5)   11/19/18  01:22    


 


INR  1.06   11/19/18  01:22    


 


APTT  30.3 Seconds (25.1-36.5)   11/19/18  01:22    


 


Sodium  140 mmol/L (132-148)   11/20/18  07:15    


 


Potassium  3.9 mmol/L (3.6-5.0)   11/20/18  07:15    


 


Chloride  108 mmol/L ()  H  11/20/18  07:15    


 


Carbon Dioxide  27 mmol/L (21-33)   11/20/18  07:15    


 


Anion Gap  9  (10-20)  L  11/20/18  07:15    


 


BUN  11 mg/dL (7-21)   11/20/18  07:15    


 


Creatinine  0.7 mg/dl (0.7-1.2)   11/20/18  07:15    


 


Est GFR ( Amer)  > 60   11/20/18  07:15    


 


Est GFR (Non-Af Amer)  > 60   11/20/18  07:15    


 


POC Glucose (mg/dL)  120 mg/dL ()  H  11/19/18  05:47    


 


Random Glucose  143 mg/dL ()  H  11/20/18  07:15    


 


Calcium  8.3 mg/dL (8.4-10.5)  L  11/20/18  07:15    


 


Total Bilirubin  0.9 mg/dL (0.2-1.3)   11/20/18  07:15    


 


AST  17 U/L (14-36)   11/20/18  07:15    


 


ALT  28 U/L (7-56)   11/20/18  07:15    


 


Alkaline Phosphatase  74 U/L ()   11/20/18  07:15    


 


Lactate Dehydrogenase  450 U/L (333-699)   11/19/18  01:22    


 


Total Creatine Kinase  91 U/L ()   11/19/18  01:22    


 


Troponin I  < 0.01 ng/mL  11/19/18  01:22    


 


Total Protein  6.2 g/dL (5.8-8.3)   11/20/18  07:15    


 


Albumin  3.2 g/dL (3.0-4.8)   11/20/18  07:15    


 


Globulin  3.0 gm/dL  11/20/18  07:15    


 


Albumin/Globulin Ratio  1.1  (1.1-1.8)   11/20/18  07:15    


 


Lipase  101 U/L ()   11/19/18  01:22    














- Hospital Course


Hospital Course: 





Pt seen and examined. I have reviewed the note of the medical resident and agree

with it. I have discussed the assessment and plan with the resident.  I have 

reviewed the patient's labs and medications. Pt with acute sigmoid 

diverticulitis. She is feeling better. She is able to tolerate PO. She will be 

discharged home. She will f/u with PMD. She will finish PO Abx.

## 2018-11-20 NOTE — CP.PCM.PN
<Rosita Mcwilliams - Last Filed: 11/20/18 11:57>





Subjective





- Date & Time of Evaluation


Date of Evaluation: 11/20/18


Time of Evaluation: 11:57





- Subjective


Subjective: 


Gastroenterology Fellow/PGY6 Progress Note





Patient notes improving abdominal pain. Tolerating liquid diet. Admits to 

multiple soft stools yesterday. A 12-point review of systems negative except for

as above.








Objective





- Vital Signs/Intake and Output


Vital Signs (last 24 hours): 


                                        











Temp Pulse Resp BP Pulse Ox


 


 98.1 F   60   19   121/75   95 


 


 11/20/18 08:31  11/20/18 08:31  11/20/18 08:31  11/20/18 08:31  11/20/18 08:31








Intake and Output: 


                                        











 11/20/18 11/20/18





 06:59 18:59


 


Intake Total 840 1200


 


Balance 840 1200














- Medications


Medications: 


                               Current Medications





Acetaminophen (Tylenol 325mg Tab)  650 mg PO Q6H PRN


   PRN Reason: Pain, moderate (4-7)


   Last Admin: 11/19/18 22:23 Dose:  650 mg


Metronidazole (Flagyl)  500 mg in 100 mls @ 100 mls/hr IVPB Q8 [facility]; Protocol


   Last Admin: 11/20/18 05:20 Dose:  100 mls/hr


Ceftriaxone Sodium (Rocephin 1 Gram Ivpb)  1 gm in 100 mls @ 100 mls/hr IVPB 

DAILY [facility]; Protocol


Oxycodone/Acetaminophen (Percocet 5/325 Mg Tab)  1 tab PO Q6H PRN


   PRN Reason: Pain, severe (8-10)


   Stop: 11/23/18 07:06


Pantoprazole Sodium (Protonix Inj)  40 mg IVP DAILY [facility]


   Last Admin: 11/20/18 11:13 Dose:  40 mg


Promethazine HCl (Phenergan Syrup)  6.25 mg PO BID [facility]


   Last Admin: 11/20/18 11:13 Dose:  6.25 mg











- Labs


Labs: 


                                        





                                 11/20/18 07:15 





                                 11/20/18 07:15 





                                        











PT  12.1 SECONDS (9.4-12.5)   11/19/18  01:22    


 


INR  1.06   11/19/18  01:22    


 


APTT  30.3 Seconds (25.1-36.5)   11/19/18  01:22    














- Constitutional


Appears: Non-toxic, No Acute Distress





- Head Exam


Head Exam: ATRAUMATIC, NORMOCEPHALIC





- Eye Exam


Eye Exam: EOMI, PERRL.  absent: Scleral icterus


Pupil Exam: PERRL.  absent: Miosis, Mydriatic





- ENT Exam


ENT Exam: Mucous Membranes Moist, Normal Oropharynx





- Neck Exam


Neck Exam: Full ROM, Normal Inspection





- Respiratory Exam


Respiratory Exam: Clear to Ausculation Bilateral.  absent: Rales, Rhonchi, 

Wheezes





- Cardiovascular Exam


Cardiovascular Exam: RRR, +S1, +S2.  absent: Gallop, Rubs





- GI/Abdominal Exam


GI & Abdominal Exam: Soft, Tenderness, Normal Bowel Sounds.  absent: Distended, 

Firm, Guarding, Rigid, Organomegaly, Rebound


Additional comments: 


mild B/L LQ discomfort to palpation








- Extremities Exam


Extremities Exam: Normal Inspection.  absent: Pedal Edema





- Neurological Exam


Neurological Exam: Alert, Awake





- Psychiatric Exam


Psychiatric exam: Normal Affect, Normal Mood





- Skin


Skin Exam: Dry, Intact, Normal Color, Warm





Assessment and Plan





- Assessment and Plan (Free Text)


Assessment: 


68 year old female with PMH of Diabetes, HTN, and osteoporosis presenting with 

abdominal pain. Active treatment of uncomplicated sigmoid diverticulitis noted 

on CT A/P IV contrast. Endorses prior EGD and colonoscopy in last five months to

be normal.





Plan: 





-Cdiff negative


-diet advanced, tolerated solid foods


-will require 10-14 day antibiotic course


-endorses recent colonoscopy in last 6 months with poor prep











<Wilton,Kovil V - Last Filed: 11/21/18 00:03>





Objective





- Vital Signs/Intake and Output


Vital Signs (last 24 hours): 


                                        











Temp Pulse Resp BP Pulse Ox


 


 98.1 F   60   19   121/75   95 


 


 11/20/18 08:31  11/20/18 08:31  11/20/18 08:31  11/20/18 08:31  11/20/18 08:31








Intake and Output: 


                                        











 11/20/18 11/21/18





 18:59 06:59


 


Intake Total 1200 


 


Balance 1200 














- Labs


Labs: 


                                        





                                 11/20/18 07:15 





                                 11/20/18 07:15 





                                        











PT  12.1 SECONDS (9.4-12.5)   11/19/18  01:22    


 


INR  1.06   11/19/18  01:22    


 


APTT  30.3 Seconds (25.1-36.5)   11/19/18  01:22    














Attending/Attestation





- Attestation


I have personally seen and examined this patient.: Yes


I have fully participated in the care of the patient.: Yes


I have reviewed all pertinent clinical information, including history, physical 

exam and plan: Yes


Notes (Text): 


This is an addendum to GI progress report dictated by the GI Fellow.The patient 

was seen and examined earlier.  Medical records, lab studies, imagings were re

viewed.  Last 24 hours events reviewed.  Agreed with the above treatment plan as

outlined in GI Fellow 's notes with the addition of the following


11/21/18 00:03

## 2019-01-26 ENCOUNTER — HOSPITAL ENCOUNTER (EMERGENCY)
Dept: HOSPITAL 42 - ED | Age: 69
Discharge: LEFT BEFORE DISCHARGE | End: 2019-01-26
Payer: MEDICARE

## 2019-01-26 VITALS — BODY MASS INDEX: 31.2 KG/M2

## 2019-01-26 DIAGNOSIS — Z02.89: Primary | ICD-10-CM

## 2019-01-26 DIAGNOSIS — R50.9: ICD-10-CM
